# Patient Record
Sex: MALE | Race: WHITE | NOT HISPANIC OR LATINO | Employment: UNEMPLOYED | URBAN - METROPOLITAN AREA
[De-identification: names, ages, dates, MRNs, and addresses within clinical notes are randomized per-mention and may not be internally consistent; named-entity substitution may affect disease eponyms.]

---

## 2018-08-14 PROBLEM — L02.11 NECK ABSCESS: Status: ACTIVE | Noted: 2018-08-14

## 2018-08-14 PROBLEM — M54.30 SCIATICA: Status: ACTIVE | Noted: 2018-08-14

## 2018-08-14 RX ORDER — DIPHENOXYLATE HYDROCHLORIDE AND ATROPINE SULFATE 2.5; .025 MG/1; MG/1
TABLET ORAL DAILY
COMMUNITY

## 2018-10-16 ENCOUNTER — TELEPHONE (OUTPATIENT)
Dept: UROLOGY | Facility: AMBULATORY SURGERY CENTER | Age: 48
End: 2018-10-16

## 2018-10-16 NOTE — TELEPHONE ENCOUNTER
Reason for appointment/Complaint/Diagnosis : vas consult     Insurance: [facility]     History of Cancer? no                       If yes, what kind? Previous urologist?     no                   Records requested/where? No     Outside testing/where? No     Location Preference for office visit?  [facility]

## 2018-11-27 ENCOUNTER — DOCUMENTATION (OUTPATIENT)
Dept: UROLOGY | Facility: CLINIC | Age: 48
End: 2018-11-27

## 2018-11-27 NOTE — PROGRESS NOTES
Patient did not present for their scheduled appointment despite reminder phone calls  We would be happy to reschedule patient if they or their primary team would like

## 2019-02-21 ENCOUNTER — APPOINTMENT (EMERGENCY)
Dept: RADIOLOGY | Facility: HOSPITAL | Age: 49
End: 2019-02-21

## 2019-02-21 ENCOUNTER — HOSPITAL ENCOUNTER (EMERGENCY)
Facility: HOSPITAL | Age: 49
Discharge: HOME | End: 2019-02-21
Attending: FAMILY MEDICINE | Admitting: FAMILY MEDICINE

## 2019-02-21 VITALS
TEMPERATURE: 99.1 F | HEART RATE: 92 BPM | WEIGHT: 152 LBS | HEIGHT: 71 IN | OXYGEN SATURATION: 100 % | SYSTOLIC BLOOD PRESSURE: 147 MMHG | DIASTOLIC BLOOD PRESSURE: 68 MMHG | RESPIRATION RATE: 18 BRPM | BODY MASS INDEX: 21.28 KG/M2

## 2019-02-21 DIAGNOSIS — S63.501A SPRAIN OF RIGHT WRIST, INITIAL ENCOUNTER: Primary | ICD-10-CM

## 2019-02-21 PROCEDURE — 99283 EMERGENCY DEPT VISIT LOW MDM: CPT

## 2019-02-21 PROCEDURE — 73110 X-RAY EXAM OF WRIST: CPT

## 2022-08-03 ENCOUNTER — OFFICE VISIT (OUTPATIENT)
Dept: URGENT CARE | Facility: CLINIC | Age: 52
End: 2022-08-03
Payer: COMMERCIAL

## 2022-08-03 VITALS
BODY MASS INDEX: 20.47 KG/M2 | DIASTOLIC BLOOD PRESSURE: 74 MMHG | RESPIRATION RATE: 20 BRPM | HEART RATE: 72 BPM | TEMPERATURE: 98 F | HEIGHT: 70 IN | OXYGEN SATURATION: 97 % | SYSTOLIC BLOOD PRESSURE: 130 MMHG | WEIGHT: 143 LBS

## 2022-08-03 DIAGNOSIS — K04.7 DENTAL ABSCESS: Primary | ICD-10-CM

## 2022-08-03 DIAGNOSIS — K02.9 DENTAL DECAY: ICD-10-CM

## 2022-08-03 PROCEDURE — 99213 OFFICE O/P EST LOW 20 MIN: CPT | Performed by: NURSE PRACTITIONER

## 2022-08-03 RX ORDER — CHLORHEXIDINE GLUCONATE 0.12 MG/ML
15 RINSE ORAL 2 TIMES DAILY
Qty: 473 ML | Refills: 0 | Status: SHIPPED | OUTPATIENT
Start: 2022-08-03

## 2022-08-03 RX ORDER — CLINDAMYCIN HYDROCHLORIDE 300 MG/1
300 CAPSULE ORAL 3 TIMES DAILY
Qty: 21 CAPSULE | Refills: 0 | Status: SHIPPED | OUTPATIENT
Start: 2022-08-03 | End: 2022-08-10

## 2022-08-03 NOTE — PROGRESS NOTES
[id number] [facility]        NAME: Virgil Biggs is a 46 y o  male  : 1970    MRN: [id number]  DATE: August 3, 2022  TIME: 2:20 PM    Assessment and Plan   Dental abscess [K04 7]  1  Dental abscess  clindamycin (CLEOCIN) 300 MG capsule    chlorhexidine (PERIDEX) 0 12 % solution   2  Dental decay  clindamycin (CLEOCIN) 300 MG capsule    chlorhexidine (PERIDEX) 0 12 % solution         Patient Instructions       Follow up with PCP in 3-5 days  Proceed to  ER if symptoms worsen  You have been prescribed clindamycin and peridex oral rinse - use as prescribed  Take a probiotic or eat yogurt while taking antibiotic to prevent diarrhea  Take tylenol and/or motrin for pain  Find a dentist  Follow up with your PCP   Go to the ED if symptoms worsen          Chief Complaint     Chief Complaint   Patient presents with    Dental Pain     X 4 days with right side facial swelling , took several doses amoxicillin but ran out          History of Present Illness       This is a 46year old male with extensive dental decay who has had 4 days of right sided facial swelling and abscess noted on front teeth  He states his kids had some left over amoxicillin and he took it w/o any relief  He denies having a dentist but does have one in mind  Review of Systems   Review of Systems   Constitutional: Negative  HENT: Positive for dental problem  Eyes: Negative  Respiratory: Negative  Cardiovascular: Negative  Gastrointestinal: Negative  Endocrine: Negative  Genitourinary: Negative  Musculoskeletal: Negative  Skin: Negative  Allergic/Immunologic: Negative  Neurological: Negative  Hematological: Negative  Psychiatric/Behavioral: Negative            Current Medications       Current Outpatient Medications:     chlorhexidine (PERIDEX) 0 12 % solution, Apply 15 mL to the mouth or throat 2 (two) times a day, Disp: 473 mL, Rfl: 0    clindamycin (CLEOCIN) 300 MG capsule, Take 1 capsule (300 mg total) by mouth 3 (three) times a day for 7 days, Disp: 21 capsule, Rfl: 0    multivitamin (THERAGRAN) TABS, Take by mouth daily, Disp: , Rfl:     Current Allergies     Allergies as of 08/03/2022    (No Known Allergies)            The following portions of the patient's history were reviewed and updated as appropriate: allergies, current medications, past family history, past medical history, past social history, past surgical history and problem list      History reviewed  No pertinent past medical history  Past Surgical History:   Procedure Laterality Date    HERNIA REPAIR      INGUINAL HERNIA REPAIR         Family History   Problem Relation Age of Onset    No Known Problems Family     Breast cancer Mother     Other Father         lungs         Medications have been verified  Objective   /74   Pulse 72   Temp 98 °F (36 7 °C)   Resp 20   Ht 5' 10" (1 778 m)   Wt 64 9 kg (143 lb)   SpO2 97%   BMI 20 52 kg/m²   No LMP for male patient  Physical Exam     Physical Exam  Vitals and nursing note reviewed  Constitutional:       General: He is not in acute distress  Appearance: Normal appearance  He is normal weight  He is not ill-appearing, toxic-appearing or diaphoretic  HENT:      Head: Normocephalic and atraumatic  Nose: Nose normal       Mouth/Throat:      Mouth: Mucous membranes are moist       Pharynx: No oropharyngeal exudate or posterior oropharyngeal erythema  Eyes:      Extraocular Movements: Extraocular movements intact  Cardiovascular:      Rate and Rhythm: Normal rate  Pulses: Normal pulses  Pulmonary:      Effort: Pulmonary effort is normal    Musculoskeletal:         General: Normal range of motion  Cervical back: Normal range of motion  Skin:     General: Skin is warm and dry  Capillary Refill: Capillary refill takes less than 2 seconds  Neurological:      General: No focal deficit present        Mental Status: He is alert and oriented to person, place, and time  Psychiatric:         Mood and Affect: Mood normal          Behavior: Behavior normal          Thought Content:  Thought content normal          Judgment: Judgment normal

## 2022-08-03 NOTE — PATIENT INSTRUCTIONS
You have been prescribed clindamycin and peridex oral rinse - use as prescribed  Take a probiotic or eat yogurt while taking antibiotic to prevent diarrhea  Take tylenol and/or motrin for pain  Find a dentist  Follow up with your PCP   Go to the ED if symptoms worsen

## 2024-09-17 ENCOUNTER — HOSPITAL ENCOUNTER (EMERGENCY)
Facility: HOSPITAL | Age: 54
Discharge: HOME | End: 2024-09-17
Attending: EMERGENCY MEDICINE | Admitting: EMERGENCY MEDICINE
Payer: OTHER MISCELLANEOUS

## 2024-09-17 ENCOUNTER — APPOINTMENT (EMERGENCY)
Dept: RADIOLOGY | Facility: HOSPITAL | Age: 54
End: 2024-09-17
Payer: OTHER MISCELLANEOUS

## 2024-09-17 VITALS
TEMPERATURE: 97.8 F | SYSTOLIC BLOOD PRESSURE: 123 MMHG | OXYGEN SATURATION: 98 % | HEART RATE: 76 BPM | WEIGHT: 154 LBS | DIASTOLIC BLOOD PRESSURE: 59 MMHG | RESPIRATION RATE: 18 BRPM | BODY MASS INDEX: 22.1 KG/M2

## 2024-09-17 DIAGNOSIS — T14.8XXA CRUSH INJURY: Primary | ICD-10-CM

## 2024-09-17 PROCEDURE — 99283 EMERGENCY DEPT VISIT LOW MDM: CPT

## 2024-09-17 PROCEDURE — 73140 X-RAY EXAM OF FINGER(S): CPT

## 2024-09-17 PROCEDURE — 99284 EMERGENCY DEPT VISIT MOD MDM: CPT | Performed by: EMERGENCY MEDICINE

## 2024-09-17 PROCEDURE — 90715 TDAP VACCINE 7 YRS/> IM: CPT | Performed by: EMERGENCY MEDICINE

## 2024-09-17 PROCEDURE — 90471 IMMUNIZATION ADMIN: CPT

## 2024-09-17 RX ORDER — ROPIVACAINE HYDROCHLORIDE 5 MG/ML
5 INJECTION, SOLUTION EPIDURAL; INFILTRATION; PERINEURAL ONCE
Status: DISCONTINUED | OUTPATIENT
Start: 2024-09-17 | End: 2024-09-17 | Stop reason: HOSPADM

## 2024-09-17 RX ORDER — NAPROXEN 500 MG/1
500 TABLET ORAL ONCE
Status: COMPLETED | OUTPATIENT
Start: 2024-09-17 | End: 2024-09-17

## 2024-09-17 RX ADMIN — NAPROXEN 500 MG: 500 TABLET ORAL at 13:05

## 2024-09-17 RX ADMIN — TETANUS TOXOID, REDUCED DIPHTHERIA TOXOID AND ACELLULAR PERTUSSIS VACCINE, ADSORBED 0.5 ML: 5; 2.5; 8; 8; 2.5 SUSPENSION INTRAMUSCULAR at 12:36

## 2024-09-17 NOTE — ED PROVIDER NOTES
1. Crush injury      ED Disposition       ED Disposition   Discharge    Condition   Stable    Date/Time   Tue Sep 17, 2024  1:06 PM    Comment   Phong Khalil discharge to home/self care.                   Assessment & Plan       Medical Decision Making  54 yo RHD male works as  2 days ago sustains crush injury to distal left fifth digit did not seek care because he could tough it out. No clinical evidence of tedon injury will update tetanus proceed with plain film eval to assess for fracture; proceed with digital block to facilitate cleaning of digit and re-eval    Patient declines digital block wound was cleansed with chlorahexadine  patient has superficial abrasions wounds do not require closure there is no evidence of a subungual hematoma.  Patient does have ecchymosis from the mid middle phalanx distally but the soft tissues are soft.  Recommend Xeroform dressing and nessa taping and this was demonstrated for the patient and orthopedic follow-up will be restricted to no heavy lifting greater than 5 pounds to the left hand until recheck with Ortho he was given contact information for orthopedics here as well as hand surgery.    Reviewed radiographs with the patient.  Reviewed reasons for return patient is comfortable with plan.    Amount and/or Complexity of Data Reviewed  Radiology: independent interpretation performed.    Risk  Prescription drug management.                       Medications   ropivacaine (NAROPIN) 0.5 % injection 5 mL (0 mL Infiltration Hold 9/17/24 1237)   tetanus-diphtheria-acellular pertussis (BOOSTRIX) IM injection 0.5 mL (0.5 mL Intramuscular Given 9/17/24 1236)   naproxen (NAPROSYN) tablet 500 mg (500 mg Oral Given 9/17/24 1305)       History of Present Illness       53-year-old right-hand-dominant male while at work 2 nights ago working as a  sustains a crush injury to the distal aspect of his left fifth digit.  They were working on flipping a metal lid  holding onto a bar the lid weighs approximately 150 pounds when the lid ended up crushing the left fifth digit DIP distally they were able to quickly get the lid off he states he sustained an injury to his proximal nail as well as a small laceration on the radial aspect of the DIP joint he did not seek care because he thought he could tough it out he denies any weakness numbness or paresthesias no foreign body sensation it just throbs.  He is not sure of his last tetanus.  He denies any other injuries.  Injury report was filled out at work.  Past medical history sciatica past surgical history hernia repair            Review of Systems   Constitutional:  Positive for activity change.   Skin:  Positive for wound (left fifth digit).   Neurological:  Negative for weakness and numbness.   All other systems reviewed and are negative.          Objective     ED Triage Vitals [09/17/24 1221]   Temperature Pulse Blood Pressure Respirations SpO2 Patient Position - Orthostatic VS   97.8 °F (36.6 °C) 76 123/59 18 98 % Sitting      Temp Source Heart Rate Source BP Location FiO2 (%) Pain Score    Temporal Monitor Right arm -- 8        Physical Exam  Vitals and nursing note reviewed.   Constitutional:       General: He is not in acute distress.     Appearance: He is not ill-appearing, toxic-appearing or diaphoretic.   Musculoskeletal:      Comments: Left hand: intact 2+ radial pulse able to oppose thumb to fifth digit no tenderness to metacarpal region of hand or thumb thru long finger; patient is able to oppose his left thumb to all digits.  He is able to extend the fifth digit without difficulty lumbricals are intact FDP and FDS were isolated found to be intact.  There is no evidence of a subungual hematoma less than 4 mm 2 point discrimination intact to the radial ulnar aspect of the digit.  Please see media tab for further images there is no full-thickness lacerations just superficial abrasions.  Patient does have ecchymosis  but the soft tissues remain soft. No rotational deformity   Neurological:      General: No focal deficit present.      Mental Status: He is alert.      Cranial Nerves: No cranial nerve deficit.      Sensory: No sensory deficit.      Motor: No weakness.      Coordination: Coordination normal.      Gait: Gait normal.   Psychiatric:         Mood and Affect: Mood normal.         Labs Reviewed - No data to display  XR finger fifth digit-pinkie LEFT   ED Interpretation by Destiny Clark MD (09/17 2728)   Per my independent interpretation. Radiologist to provide formal read. No acute fracture          Procedures       Destiny Clark MD  09/17/24 4725

## 2024-09-17 NOTE — DISCHARGE INSTRUCTIONS
Keep wound clean and dry  Soap and water with xeroform dressing twice daily  Buddy taping /dressing to left ring finger  Aleve 2 tabs twice daily with food OR ibuprofen 200-800mg every 8 hours with food as needed for pain   Tylenol 650mg every 6 hours as needed for pain, fever (max 3000mg in 24 hours)   Return with increased redness pus drainage swelling or any signs of wound infection  Elevate as much as possible  Return with increased pain

## 2024-09-17 NOTE — Clinical Note
Phong Khalil was seen and treated in our emergency department on 9/17/2024.            no heavy lifting greater than 5 pounds left hand until recheck with erica    Diagnosis:     Phong  may return to work on return date.    He may return on this date: 09/17/2024         If you have any questions or concerns, please don't hesitate to call.      Destiny Clark MD    ______________________________           _______________          _______________  Hospital Representative                              Date                                Time

## 2024-09-20 ENCOUNTER — OCCMED (OUTPATIENT)
Dept: URGENT CARE | Facility: CLINIC | Age: 54
End: 2024-09-20
Payer: OTHER MISCELLANEOUS

## 2024-09-20 DIAGNOSIS — M79.645 PAIN OF FINGER OF LEFT HAND: ICD-10-CM

## 2024-09-20 DIAGNOSIS — S67.197D CRUSHING INJURY OF LEFT LITTLE FINGER, SUBSEQUENT ENCOUNTER: Primary | ICD-10-CM

## 2024-09-20 PROCEDURE — 99214 OFFICE O/P EST MOD 30 MIN: CPT | Performed by: NURSE PRACTITIONER
